# Patient Record
Sex: FEMALE | Race: WHITE
[De-identification: names, ages, dates, MRNs, and addresses within clinical notes are randomized per-mention and may not be internally consistent; named-entity substitution may affect disease eponyms.]

---

## 2018-05-09 ENCOUNTER — HOSPITAL ENCOUNTER (EMERGENCY)
Dept: HOSPITAL 77 - KA.ED | Age: 38
Discharge: HOME | End: 2018-05-09
Payer: SELF-PAY

## 2018-05-09 DIAGNOSIS — K04.7: Primary | ICD-10-CM

## 2018-05-09 PROCEDURE — 99282 EMERGENCY DEPT VISIT SF MDM: CPT

## 2018-05-09 PROCEDURE — 96372 THER/PROPH/DIAG INJ SC/IM: CPT

## 2024-06-10 NOTE — EDM.PDOC
ED HPI GENERAL MEDICAL PROBLEM





- General


Chief Complaint: ENT Problem


Stated Complaint: INFECTION IN TOOTH


Time Seen by Provider: 05/09/18 08:08


Source of Information: Reports: Patient


History Limitations: Reports: No Limitations





- History of Present Illness


INITIAL COMMENTS - FREE TEXT/NARRATIVE: 





PATIENT IS A 38-YEAR-OLD FEMALE WHO PRESENTS TO THE EMERGENCY DEPARTMENT THIS 

MORNING WITH A COMPLAINT OF LEFT SIDED DENTAL PAIN.  PATIENT STATES THAT SHE 

HAS CHRONIC ISSUES WITH HER TEETH, AND YESTERDAY PAIN BEGAN TO WORSEN ON LEFT 

UPPER JAW.  WOKE UP THIS MORNING AND NOTICED SOME SWELLING AND DECIDED TO 

PRESENT TO THE EMERGENCY DEPARTMENT.  PATIENT DENIES FEVER, DIFFICULTY 

SWALLOWING, CHEST PAIN, SHORTNESS OF BREATH, OR ANY SPECIFIC TRAUMA.


Onset: Gradual


Onset Date: 05/08/18


Duration: Day(s):


Location: Reports: Face


Quality: Reports: Sharp


Severity: Moderate


Improves with: Reports: None


Worsens with: Reports: Eating


Associated Symptoms: Reports: No Other Symptoms.  Denies: Fever/Chills


  ** Left Tooth/Teeth


Pain Score (Numeric/FACES): 10





- Related Data


 Allergies











Allergy/AdvReac Type Severity Reaction Status Date / Time


 


No Known Drug Allergies Allergy  Cannot Verified 05/09/18 07:18





   Remember  











Home Meds: 


 Home Meds





Amoxicillin 500 mg PO TID #30 capsule 05/09/18 [Rx]











ED ROS ENT





- Review of Systems


Review Of Systems: ROS reveals no pertinent complaints other than HPI.


Constitutional: Reports: No Symptoms.  Denies: Fever


HEENT: Reports: Dental Pain.  Denies: Throat Pain, Throat Swelling


Respiratory: Reports: No Symptoms


Cardiovascular: Reports: No Symptoms


Endocrine: Reports: No Symptoms


GI/Abdominal: Reports: No Symptoms


: Reports: No Symptoms


Musculoskeletal: Reports: No Symptoms


Skin: Reports: No Symptoms


Neurological: Reports: No Symptoms


Psychiatric: Reports: No Symptoms


Hematologic/Lymphatic: Reports: No Symptoms


Immunologic: Reports: No Symptoms





ED EXAM, ENT





- Physical Exam


Exam: See Below


Exam Limited By: No Limitations


General Appearance: Alert, WD/WN, No Apparent Distress


Nose: Normal Inspection, Normal Mucousa, No Blood


Mouth/Throat: Normal Oropharynx, Dental Pain, Dental Tenderness, Other (MILD 

EDEMA LEFT CHEEK).  No: Normal Teeth, Dental Abcess, Dental Trauma, Drooling, 

Dry Mucous Membrane, Gum Swelling, Lip Swelling, Muffled Voice, Oral Ulcers, 

Peritonsillar Mass, Pharyngeal Erythema, Throat Pain, Tongue Swelling, 

Tonsillar Erythema, Tonsillar Exudates, Tonsillar Swelling, Trismus, Uvular 

Deviation, Uvular Edema


Head: Atraumatic, Normocephalic


Neck: Normal Inspection, Supple, Non-Tender.  No: Lymphadenopathy (L), 

Lymphadenopathy (R)


Respiratory/Chest: No Respiratory Distress, Lungs Clear


Neurological: Alert, Oriented, Normal Cognition


Psychiatric: Normal Affect, Normal Mood


Skin: Warm, Dry, Intact, Normal Color, No Rash


Lymphatic: No Adenopathy





Course





- Vital Signs


Last Recorded V/S: 





 Last Vital Signs











Temp  96.1 F   05/09/18 07:15


 


Pulse  66   05/09/18 07:15


 


Resp  20   05/09/18 07:15


 


BP  111/65   05/09/18 07:15


 


Pulse Ox  99   05/09/18 07:15














- Orders/Labs/Meds


Meds: 





Medications














Discontinued Medications














Generic Name Dose Route Start Last Admin





  Trade Name Freq  PRN Reason Stop Dose Admin


 


Ceftriaxone Sodium  1 gm  05/09/18 07:55  





  Rocephin  IM  05/09/18 07:56  





  ONETIME ONE   





     





     





     





     


 


Oxycodone/Acetaminophen  1 tab  05/09/18 07:55  





  Percocet 325-5 Mg  PO  05/09/18 07:56  





  ONETIME ONE   





     





     





     





     














- Re-Assessments/Exams


Free Text/Narrative Re-Assessment/Exam: 





05/09/18 08:17


PATIENT AFEBRILE, NONTOXIC APPEARING, VITAL SIGNS STABLE, TAKING BY MOUTH 

FLUIDS WITHOUT DIFFICULTY.  1 G IM ROCEPHIN GIVEN.  PATIENT WILL BE STARTED ON 

AMOXICILLIN AND RETURN TO THE EMERGENCY DEPARTMENT TOMORROW FOR RECHECK.  SHE 

WILL SCHEDULE DENTAL APPOINTMENT ASAP





Departure





- Departure


Time of Disposition: 08:18


Disposition: Home, Self-Care 01


Condition: Good


Clinical Impression: 


 Dental caries, Toothache








- Discharge Information


Instructions:  Preventive Dental Care, Adult


Referrals: 


PCP,None [Primary Care Provider] - 


Additional Instructions: 


RETURN TO EMERGENCY DEPARTMENT WITHIN 24 HOURS FOR RECHECK. 


SCHEDULE APPOINTMENT WITH DENTIST AS SOON AS POSSIBLE





- Assessment/Plan


Assessment:: 





DENTAL CARIES, TOOTHACHE


Plan: 





RETURN TO THE ER TOMORROW FOR RECHECK.  FOLLOW-UP WITH DENTIST none